# Patient Record
Sex: FEMALE | Race: WHITE | Employment: OTHER | URBAN - METROPOLITAN AREA
[De-identification: names, ages, dates, MRNs, and addresses within clinical notes are randomized per-mention and may not be internally consistent; named-entity substitution may affect disease eponyms.]

---

## 2023-07-30 ENCOUNTER — HOSPITAL ENCOUNTER (EMERGENCY)
Age: 88
Discharge: HOME | End: 2023-07-30
Attending: EMERGENCY MEDICINE
Payer: MEDICARE

## 2023-07-30 VITALS
OXYGEN SATURATION: 96 % | HEIGHT: 65 IN | RESPIRATION RATE: 16 BRPM | DIASTOLIC BLOOD PRESSURE: 72 MMHG | SYSTOLIC BLOOD PRESSURE: 127 MMHG | TEMPERATURE: 98 F | HEART RATE: 59 BPM | BODY MASS INDEX: 22.49 KG/M2 | WEIGHT: 135 LBS

## 2023-07-30 DIAGNOSIS — L50.9 URTICARIA: Primary | ICD-10-CM

## 2023-07-30 PROCEDURE — 99283 EMERGENCY DEPT VISIT LOW MDM: CPT

## 2023-07-30 PROCEDURE — 6370000000 HC RX 637 (ALT 250 FOR IP): Performed by: PHYSICIAN ASSISTANT

## 2023-07-30 RX ORDER — DEXAMETHASONE SODIUM PHOSPHATE 10 MG/ML
8 INJECTION INTRAMUSCULAR; INTRAVENOUS
Status: DISCONTINUED | OUTPATIENT
Start: 2023-07-30 | End: 2023-07-30

## 2023-07-30 RX ORDER — HYDROXYZINE PAMOATE 25 MG/1
25 CAPSULE ORAL 3 TIMES DAILY PRN
Qty: 21 CAPSULE | Refills: 0 | Status: SHIPPED | OUTPATIENT
Start: 2023-07-30 | End: 2023-08-06

## 2023-07-30 RX ORDER — FAMOTIDINE 20 MG/1
20 TABLET, FILM COATED ORAL
Status: COMPLETED | OUTPATIENT
Start: 2023-07-30 | End: 2023-07-30

## 2023-07-30 RX ADMIN — FAMOTIDINE 20 MG: 20 TABLET, FILM COATED ORAL at 11:58

## 2023-07-30 ASSESSMENT — LIFESTYLE VARIABLES
HOW MANY STANDARD DRINKS CONTAINING ALCOHOL DO YOU HAVE ON A TYPICAL DAY: PATIENT DOES NOT DRINK
HOW OFTEN DO YOU HAVE A DRINK CONTAINING ALCOHOL: NEVER

## 2023-07-30 ASSESSMENT — PAIN - FUNCTIONAL ASSESSMENT: PAIN_FUNCTIONAL_ASSESSMENT: NONE - DENIES PAIN

## 2023-07-30 NOTE — ED TRIAGE NOTES
Patient complaining of rash that started on 7/26, seen at [facility] from Urgent care due to low blood pressure and low saturation. Patient also advises that she was diagnosed with hives and prescribed steroids and told to take zyrtec. Patient advises it cleared somewhat the past two days however worse again this morning. Patient with rash areas that are itching and patient had placed calamine lotion to areas. Patient [facility] and unsure if any detergents were changed there but will be starting dye free detergent. Patient denies any shortness of breath or further complaints at this time.

## 2023-07-30 NOTE — ED PROVIDER NOTES
Emergency Department Provider Note       PCP: DEBBI Alegria   Age: 80 y.o. Sex: female     DISPOSITION Decision To Discharge 07/30/2023 12:52:58 PM       ICD-10-CM    1. Urticaria  L50.9           Medical Decision Making     Complexity of Problems Addressed:  Complexity of Problem: 1 acute, uncomplicated illness or injury. Data Reviewed and Analyzed:  Category 1:   I independently ordered and reviewed each unique test.  I reviewed external records: ED visit note from an outside group. The patients assessment required an independent historian: son/daugther. The reason they were needed is important historical information not provided by the patient. Category 3: Discussion of management or test interpretation. 40-year-old female presenting to the emergency department for reevaluation of urticaria. Seen at outside facility a few days ago, was improving but then seemed to become more pruritic today. No swelling/respiratory symptoms. On exam she does have macular though papular rash to her back, posterior aspect of her arms and legs. No signs to suggest secondary infection, there are no fluid-filled lesions. Symptoms are bilateral, I do not suspect shingles. The rash does seem to be urticarial in appearance. Does not look like bedbug/insect bites. Patient is already on prednisone at home though I do not see a prescription for this in her chart, per family she is taking 40 mg daily. She can continue taking this. She can continue the calamine lotion. If she would like to trial Vistaril instead of the Zyrtec we can add this which they are in agreement with. Advised against taking both antihistamines together due to increased risk of anticholinergic effect. Family states that they are going to start washing the patient's clothes and bedding with a hypoallergenic laundry detergent to see if this clears the rash. Follow-up with the PCP provider at the facility this week.   Return for any

## 2023-07-30 NOTE — DISCHARGE INSTRUCTIONS
You can trial the Vistaril as needed for additional itch relief as this can be taken up to 3 times a day if needed however I do not recommend taking with Zyrtec. Continue taking the prednisone that was prescribed to you. I recommend following up with the primary care provider at the facility regarding the rash and seeing if this resolves later this week. Discussed with the facility changes in detergents of any kind and I recommend using a hypoallergenic laundry detergent to see if this helps symptoms as well. Follow-up with your PCP in 1 to 2 days if no improvement. Return to the ER for any new or worsening symptoms.

## 2023-08-09 ENCOUNTER — TELEPHONE (OUTPATIENT)
Dept: PHARMACY | Facility: CLINIC | Age: 88
End: 2023-08-09

## 2023-08-09 NOTE — TELEPHONE ENCOUNTER
[facility][note 3] CLINICAL PHARMACY: ADHERENCE REVIEW  Identified care gap per [employer]: fills at [facility] : ACE/ARB and Statin adherence    ASSESSMENT    ACE/ARB ADHERENCE    Insurance Records claims through 23 (Prior Year [address] = not reported; YTD [address] [place] [address] = 95%; Potential Fail Date: 23): Losartan 25 mg tab last filled on 23 for 30 day supply. Next refill due: 23  Prescriber Joey Clifton Koko    Per [facility] portal today last filled 7/26/23 x 12 days at [facility] pharmacy? Prescribed si tablet/capsule daily    Per Reconcile Dispense History:   LOSARTAN 25MG TABLETS 2023 30 30 each [facility] #. .. LOSARTAN 100MG TABLETS 2023 90 90 each Nga [place], [facility] #. .. LOSARTAN 100MG TABLETS 2023 90 90 each [address] [place] [address] #. .. LOSARTAN 100MG TABLETS 2022 90 90 each Nga [place], [facility] #. .. LOSARTAN 100MG TABLETS 2022 90 90 each Nga [facility] #. .. BP Readings from Last 3 Encounters:   23 127/72     CrCl cannot be calculated (No successful lab value found. ). No results found for: CREATININE  No results found for: [facility] Records claims through 23 (Prior Year [address] = not reported; YTD [address] = FIRST FILL; Potential Fail Date: 23): Atorvastatin 40 mg tab last filled on 23 for 90 day supply. Next refill due: 23  Prescriber LEIGH ANN CARABALLO    Prescribed si tablet/capsule daily    Per Reconcile Dispense History:   ATORVASTATIN 40MG TABLETS 2023 90 90 each Nga [place], [facility] #. .. ATORVASTATIN 40MG TABLETS 2022 90 90 each [address] [place] [address] #. .. ATORVASTATIN 40MG TABLETS 2022 90 90 each [address] [place] [address] #. ..   9RR per hyperlink    PLAN  The following are interventions that have been

## 2024-04-01 ENCOUNTER — APPOINTMENT (OUTPATIENT)
Dept: CT IMAGING | Age: 89
End: 2024-04-01
Payer: MEDICARE

## 2024-04-01 ENCOUNTER — HOSPITAL ENCOUNTER (EMERGENCY)
Age: 89
Discharge: HOME | End: 2024-04-02
Attending: EMERGENCY MEDICINE
Payer: MEDICARE

## 2024-04-01 ENCOUNTER — APPOINTMENT (OUTPATIENT)
Dept: GENERAL RADIOLOGY | Age: 89
End: 2024-04-01
Payer: MEDICARE

## 2024-04-01 DIAGNOSIS — I10 ESSENTIAL HYPERTENSION: ICD-10-CM

## 2024-04-01 DIAGNOSIS — R55 SYNCOPE AND COLLAPSE: Primary | ICD-10-CM

## 2024-04-01 DIAGNOSIS — I71.40 ABDOMINAL AORTIC ANEURYSM (AAA) WITHOUT RUPTURE, UNSPECIFIED PART (HCC): ICD-10-CM

## 2024-04-01 LAB
ALBUMIN SERPL-MCNC: 3.6 G/DL (ref 3.2–4.6)
ALBUMIN/GLOB SERPL: 0.9 (ref 0.4–1.6)
ALP SERPL-CCNC: 104 U/L (ref 50–130)
ALT SERPL-CCNC: 24 U/L (ref 12–65)
ANION GAP SERPL CALC-SCNC: 6 MMOL/L (ref 2–11)
AST SERPL-CCNC: 24 U/L (ref 15–37)
BASOPHILS # BLD: 0 K/UL (ref 0–0.2)
BASOPHILS NFR BLD: 0 % (ref 0–2)
BILIRUB SERPL-MCNC: 0.5 MG/DL (ref 0.2–1.1)
BILIRUB UR QL: NEGATIVE
BUN SERPL-MCNC: 22 MG/DL (ref 8–23)
CALCIUM SERPL-MCNC: 9.8 MG/DL (ref 8.3–10.4)
CHLORIDE SERPL-SCNC: 105 MMOL/L (ref 103–113)
CO2 SERPL-SCNC: 28 MMOL/L (ref 21–32)
CREAT SERPL-MCNC: 1.22 MG/DL (ref 0.6–1)
DIFFERENTIAL METHOD BLD: ABNORMAL
EKG ATRIAL RATE: 65 BPM
EKG DIAGNOSIS: NORMAL
EKG P AXIS: 71 DEGREES
EKG P-R INTERVAL: 150 MS
EKG Q-T INTERVAL: 439 MS
EKG QRS DURATION: 104 MS
EKG QTC CALCULATION (BAZETT): 457 MS
EKG R AXIS: 100 DEGREES
EKG T AXIS: 85 DEGREES
EKG VENTRICULAR RATE: 65 BPM
EOSINOPHIL # BLD: 0.1 K/UL (ref 0–0.8)
EOSINOPHIL NFR BLD: 1 % (ref 0.5–7.8)
ERYTHROCYTE [DISTWIDTH] IN BLOOD BY AUTOMATED COUNT: 15 % (ref 11.9–14.6)
GLOBULIN SER CALC-MCNC: 3.8 G/DL (ref 2.8–4.5)
GLUCOSE SERPL-MCNC: 84 MG/DL (ref 65–100)
GLUCOSE UR QL STRIP.AUTO: NEGATIVE MG/DL
HCT VFR BLD AUTO: 43.3 % (ref 35.8–46.3)
HGB BLD-MCNC: 14.1 G/DL (ref 11.7–15.4)
IMM GRANULOCYTES # BLD AUTO: 0 K/UL (ref 0–0.5)
IMM GRANULOCYTES NFR BLD AUTO: 0 % (ref 0–5)
KETONES UR-MCNC: NEGATIVE MG/DL
LEUKOCYTE ESTERASE UR QL STRIP: ABNORMAL
LYMPHOCYTES # BLD: 0.7 K/UL (ref 0.5–4.6)
LYMPHOCYTES NFR BLD: 7 % (ref 13–44)
MCH RBC QN AUTO: 29.8 PG (ref 26.1–32.9)
MCHC RBC AUTO-ENTMCNC: 32.6 G/DL (ref 31.4–35)
MCV RBC AUTO: 91.5 FL (ref 82–102)
MONOCYTES # BLD: 0.9 K/UL (ref 0.1–1.3)
MONOCYTES NFR BLD: 8 % (ref 4–12)
NEUTS SEG # BLD: 8.8 K/UL (ref 1.7–8.2)
NEUTS SEG NFR BLD: 84 % (ref 43–78)
NITRITE UR QL: NEGATIVE
NRBC # BLD: 0 K/UL (ref 0–0.2)
PH UR: 7.5 (ref 5–9)
PLATELET # BLD AUTO: 165 K/UL (ref 150–450)
PMV BLD AUTO: 9.3 FL (ref 9.4–12.3)
POTASSIUM SERPL-SCNC: 4.2 MMOL/L (ref 3.5–5.1)
PROT SERPL-MCNC: 7.4 G/DL (ref 6.3–8.2)
PROT UR QL: NEGATIVE MG/DL
RBC # BLD AUTO: 4.73 M/UL (ref 4.05–5.2)
RBC # UR STRIP: NEGATIVE
SERVICE CMNT-IMP: ABNORMAL
SODIUM SERPL-SCNC: 139 MMOL/L (ref 136–146)
SP GR UR: 1.02 (ref 1–1.02)
TROPONIN I SERPL HS-MCNC: 8.6 PG/ML (ref 0–14)
UROBILINOGEN UR QL: 0.2 EU/DL (ref 0.2–1)
WBC # BLD AUTO: 10.5 K/UL (ref 4.3–11.1)

## 2024-04-01 PROCEDURE — 93010 ELECTROCARDIOGRAM REPORT: CPT | Performed by: INTERNAL MEDICINE

## 2024-04-01 PROCEDURE — 85025 COMPLETE CBC W/AUTO DIFF WBC: CPT

## 2024-04-01 PROCEDURE — 71045 X-RAY EXAM CHEST 1 VIEW: CPT

## 2024-04-01 PROCEDURE — 72125 CT NECK SPINE W/O DYE: CPT

## 2024-04-01 PROCEDURE — 81003 URINALYSIS AUTO W/O SCOPE: CPT

## 2024-04-01 PROCEDURE — 74177 CT ABD & PELVIS W/CONTRAST: CPT

## 2024-04-01 PROCEDURE — 81015 MICROSCOPIC EXAM OF URINE: CPT

## 2024-04-01 PROCEDURE — 80053 COMPREHEN METABOLIC PANEL: CPT

## 2024-04-01 PROCEDURE — 70450 CT HEAD/BRAIN W/O DYE: CPT

## 2024-04-01 PROCEDURE — 84484 ASSAY OF TROPONIN QUANT: CPT

## 2024-04-01 PROCEDURE — 99285 EMERGENCY DEPT VISIT HI MDM: CPT

## 2024-04-01 PROCEDURE — 87086 URINE CULTURE/COLONY COUNT: CPT

## 2024-04-01 PROCEDURE — 93005 ELECTROCARDIOGRAM TRACING: CPT | Performed by: EMERGENCY MEDICINE

## 2024-04-01 PROCEDURE — 2580000003 HC RX 258: Performed by: EMERGENCY MEDICINE

## 2024-04-01 PROCEDURE — 6360000004 HC RX CONTRAST MEDICATION: Performed by: EMERGENCY MEDICINE

## 2024-04-01 RX ORDER — LOSARTAN POTASSIUM 100 MG/1
TABLET ORAL
COMMUNITY

## 2024-04-01 RX ORDER — ACETAMINOPHEN 325 MG/1
TABLET ORAL
COMMUNITY

## 2024-04-01 RX ORDER — SODIUM CHLORIDE, SODIUM LACTATE, POTASSIUM CHLORIDE, AND CALCIUM CHLORIDE .6; .31; .03; .02 G/100ML; G/100ML; G/100ML; G/100ML
500 INJECTION, SOLUTION INTRAVENOUS
Status: COMPLETED | OUTPATIENT
Start: 2024-04-01 | End: 2024-04-01

## 2024-04-01 RX ORDER — CETIRIZINE HYDROCHLORIDE 10 MG/1
10 TABLET ORAL
COMMUNITY
Start: 2023-07-27

## 2024-04-01 RX ORDER — NEOMYCIN SULFATE, POLYMYXIN B SULFATE AND HYDROCORTISONE 10; 3.5; 1 MG/ML; MG/ML; [USP'U]/ML
SUSPENSION/ DROPS AURICULAR (OTIC)
COMMUNITY

## 2024-04-01 RX ORDER — LEVOTHYROXINE SODIUM 175 UG/1
TABLET ORAL
COMMUNITY

## 2024-04-01 RX ORDER — MELOXICAM 7.5 MG/1
TABLET ORAL
COMMUNITY

## 2024-04-01 RX ORDER — ATORVASTATIN CALCIUM 40 MG/1
TABLET, FILM COATED ORAL
COMMUNITY
Start: 2023-04-06

## 2024-04-01 RX ADMIN — SODIUM CHLORIDE, POTASSIUM CHLORIDE, SODIUM LACTATE AND CALCIUM CHLORIDE 500 ML: 600; 310; 30; 20 INJECTION, SOLUTION INTRAVENOUS at 21:43

## 2024-04-01 RX ADMIN — IOPAMIDOL 100 ML: 755 INJECTION, SOLUTION INTRAVENOUS at 21:49

## 2024-04-01 ASSESSMENT — ENCOUNTER SYMPTOMS
NAUSEA: 0
COLOR CHANGE: 0
RHINORRHEA: 0
ABDOMINAL PAIN: 1
BACK PAIN: 0
VOMITING: 0
COUGH: 0
SHORTNESS OF BREATH: 0

## 2024-04-01 ASSESSMENT — PAIN - FUNCTIONAL ASSESSMENT: PAIN_FUNCTIONAL_ASSESSMENT: 0-10

## 2024-04-01 ASSESSMENT — PAIN SCALES - GENERAL: PAINLEVEL_OUTOF10: 0

## 2024-04-02 VITALS
TEMPERATURE: 97.7 F | HEIGHT: 65 IN | SYSTOLIC BLOOD PRESSURE: 148 MMHG | BODY MASS INDEX: 23.32 KG/M2 | RESPIRATION RATE: 17 BRPM | OXYGEN SATURATION: 95 % | WEIGHT: 140 LBS | DIASTOLIC BLOOD PRESSURE: 76 MMHG | HEART RATE: 65 BPM

## 2024-04-02 LAB
BACTERIA URNS QL MICRO: 0 /HPF
CASTS URNS QL MICRO: 0 /LPF
CRYSTALS URNS QL MICRO: 0 /LPF
EPI CELLS #/AREA URNS HPF: NORMAL /HPF
MUCOUS THREADS URNS QL MICRO: 0 /LPF
OTHER OBSERVATIONS: NORMAL
RBC #/AREA URNS HPF: NORMAL /HPF
WBC URNS QL MICRO: NORMAL /HPF

## 2024-04-02 NOTE — ED NOTES
Patient mobility status  with no difficulty. Provider aware     I have reviewed discharge instructions with the patient and caregiver.  The patient and caregiver verbalized understanding.    Patient left ED via Discharge Method: ambulatory to Home with family.    Opportunity for questions and clarification provided.     Patient given 0 scripts.

## 2024-04-02 NOTE — ED TRIAGE NOTES
Pt had episode of syncope today at  [facility] tonight. Pt reports loc and fell to floor. Only complaint patient had was some stomach pain earlier. Pt reporting pain in back of head and both sides of neck. Denies n/v, dizziness, blurry vision, double vision.

## 2024-04-02 NOTE — ED PROVIDER NOTES
Emergency Department Provider Note       PCP: Marissa Ramirez PA   Age: 88 y.o.   Sex: female     DISPOSITION Decision To Discharge 04/02/2024 12:10:41 AM       ICD-10-CM    1. Syncope and collapse  R55 [facility] [place]      2. Abdominal aortic aneurysm (AAA) without rupture, unspecified part (HCC)  I71.40 [facility] - Francisco Dudley MD, Vascular Surgery, [place]      3. Essential hypertension  I10           Medical Decision Making     Patient had a syncopal event earlier today and had some abdominal discomfort yesterday and later in the afternoon after her syncopal event.  There is no complaint of abdominal pain upon arrival but some mild left lower quadrant tenderness was discovered.  Labs were unremarkable.  Urine was negative for infection but cultured.  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  These were both previously present on a scan in 2021 that I found it [place].  The aortic aneurysm was slightly larger.  No leak and no inflammation.  Patient and family seemed unaware of previous findings.  Referral placed to vascular surgery and discussion with vascular surgery occurred but they did not see the patient and were not asked to see the patient.  We offered admission for syncope but the patient and family prefer to have her discharged home and follow-up once they learn cardiology follow-up could be arranged.  Their previous efforts at cardiology referral and placement had been unsuccessful and were many many months out.  ED Course as of 04/02/24 0019   Mon Apr 01, 2024   2305 Family and patient seem unaware of aortic aneurysm diagnosis.  I see it on a CT scan from 2021 at [facility].  Referral will be placed to vascular surgery for close follow-up.  Urine dip pending. [KM]   e Apr 02, 2024   0009 Reviewed CT results with vascular surgery and they will see in follow-up.  There is no inflammation on the CT around the aortic aneurysm and no

## 2024-04-02 NOTE — DISCHARGE INSTRUCTIONS
Will continue current medications.  Return for worsening symptoms.  Follow-up with your primary care doctor within a week for follow-up and recheck.  Follow-up with [facility] cardiology and vascular surgery when called with appointment time.  Call them both in 2 to 3 days if you have not heard from them.   Negative

## 2024-04-04 LAB
BACTERIA SPEC CULT: NORMAL
BACTERIA SPEC CULT: NORMAL
SERVICE CMNT-IMP: NORMAL

## 2024-04-08 ENCOUNTER — OFFICE VISIT (OUTPATIENT)
Dept: VASCULAR SURGERY | Age: 89
End: 2024-04-08
Payer: MEDICARE

## 2024-04-08 VITALS
DIASTOLIC BLOOD PRESSURE: 93 MMHG | HEIGHT: 65 IN | HEART RATE: 59 BPM | SYSTOLIC BLOOD PRESSURE: 166 MMHG | OXYGEN SATURATION: 93 % | BODY MASS INDEX: 25.33 KG/M2 | WEIGHT: 152 LBS

## 2024-04-08 DIAGNOSIS — I71.43 INFRARENAL ABDOMINAL AORTIC ANEURYSM (AAA) WITHOUT RUPTURE (HCC): Primary | ICD-10-CM

## 2024-04-08 PROCEDURE — 99203 OFFICE O/P NEW LOW 30 MIN: CPT | Performed by: STUDENT IN AN ORGANIZED HEALTH CARE EDUCATION/TRAINING PROGRAM

## 2024-04-08 PROCEDURE — 1123F ACP DISCUSS/DSCN MKR DOCD: CPT | Performed by: STUDENT IN AN ORGANIZED HEALTH CARE EDUCATION/TRAINING PROGRAM

## 2024-04-08 RX ORDER — SERTRALINE HYDROCHLORIDE 25 MG/1
25 TABLET, FILM COATED ORAL DAILY
COMMUNITY

## 2024-04-08 RX ORDER — SODIUM BICARBONATE 650 MG/1
650 TABLET ORAL 4 TIMES DAILY
COMMUNITY

## 2024-04-08 RX ORDER — LORATADINE 10 MG/1
10 TABLET ORAL DAILY
COMMUNITY

## 2024-04-08 RX ORDER — HYDROXYZINE HYDROCHLORIDE 25 MG/1
25 TABLET, FILM COATED ORAL 3 TIMES DAILY PRN
COMMUNITY

## 2024-04-08 RX ORDER — CALCIUM CARBONATE 300MG(750)
TABLET,CHEWABLE ORAL
COMMUNITY

## 2024-04-11 NOTE — PROGRESS NOTES
VASCULAR SURGERY   [address] [facility] [address] [address]  [phone] FAX: [phone]        Shannan Damon  : 1935    Reason for visit: 5.2cm    Chief Complaint: 88 y.o. female presents with 5.2cm AAA. She denies abdominal pain. CTA review with calcified iliac arteries and severe angulated infrarenal neck. Patient with history of colectomy, ostomy and extensive abdominal wall debridements. Long discussion with patient and daughter regarding her not being a surgical candidate. Patient understands implications and agrees with treatment plan.       Plan:   AAA: She is not a surgical candidate as the risks for open or endovascular repair greatly outweigh the risk for rupture for her 5.2cm AAA. No further imaging is required.     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  Recommend vascular surgery and/or interventional radiology consultation.     Findings suggestive of proctocolitis.     Indeterminant 4.3 cm right renal lesion. Recommend further evaluation with  nonemergent dedicated renal protocol CT or MR.     Additional chronic findings as above.    Constitutional:   Negative for fevers and unexplained weight loss.  Eyes:   Negative for visual disturbance.  ENT:   Negative for significant hearing loss and tinnitus.  Respiratory:   Negative for hemoptysis.  Cardiovascular:   Negative except as noted in HPI.  Gastrointestinal:   Negative for melena and abdominal pain.  Genitourinary:   Negative for hematuria, renal stones.  Integumentary:   Negative for rash or non-healing wounds  Hematologic/Lymphatic:   Negative for excessive bleeding hx or clotting disorder.  Musculoskeletal:  Negative for active, unexplained/severe joint pain.  Neurological:   Negative for stroke.    Behavioral/Psych:   Negative for suicidal

## 2024-04-12 ENCOUNTER — INITIAL CONSULT (OUTPATIENT)
Age: 89
End: 2024-04-12
Payer: MEDICARE

## 2024-04-12 VITALS
HEIGHT: 65 IN | HEART RATE: 58 BPM | BODY MASS INDEX: 25.33 KG/M2 | DIASTOLIC BLOOD PRESSURE: 90 MMHG | SYSTOLIC BLOOD PRESSURE: 148 MMHG | WEIGHT: 152 LBS

## 2024-04-12 DIAGNOSIS — I71.40 ABDOMINAL AORTIC ANEURYSM (AAA) WITHOUT RUPTURE, UNSPECIFIED PART (HCC): ICD-10-CM

## 2024-04-12 DIAGNOSIS — R55 SYNCOPE, UNSPECIFIED SYNCOPE TYPE: Primary | ICD-10-CM

## 2024-04-12 DIAGNOSIS — E78.2 MIXED HYPERLIPIDEMIA: ICD-10-CM

## 2024-04-12 DIAGNOSIS — I10 ESSENTIAL HYPERTENSION: ICD-10-CM

## 2024-04-12 PROCEDURE — 1123F ACP DISCUSS/DSCN MKR DOCD: CPT | Performed by: INTERNAL MEDICINE

## 2024-04-12 PROCEDURE — 93000 ELECTROCARDIOGRAM COMPLETE: CPT | Performed by: INTERNAL MEDICINE

## 2024-04-12 PROCEDURE — 99204 OFFICE O/P NEW MOD 45 MIN: CPT | Performed by: INTERNAL MEDICINE

## 2024-04-12 RX ORDER — LEVOTHYROXINE SODIUM 0.1 MG/1
100 TABLET ORAL EVERY MORNING
COMMUNITY
Start: 2023-06-27

## 2024-04-12 ASSESSMENT — ENCOUNTER SYMPTOMS
COUGH: 0
SHORTNESS OF BREATH: 0
ABDOMINAL PAIN: 0
VOMITING: 0
NAUSEA: 0

## 2024-04-12 NOTE — PROGRESS NOTES
[facility], [address], [address]     [place] [address]    Patient:  Shannan Damon  1935       SUBJECTIVE:  Shannan Damon is a  88 y.o. female seen for a visit regarding the following:     Chief Complaint   Patient presents with    Consultation     CC: Syncope    HPI:   88 y.o. female  with a history of moderate aortic stenosis, mild AI, HTN, HLD, 52 mm AAA who is here for consult for syncope.    Patient was evaluated in the emergency room on 4/1/2024, at that time CT imaging of the abdomen demonstrated 52 mm AAA with pseudoaneurysm of the right common iliac artery.  EKG at that time demonstrated sinus rhythm, nonspecific ST abnormalities, no acute ST segment changes concerning for ischemia injury or infarct.  She was monitored in the emergency room and discharged with follow-up.    Patient is here with her her daughter in law who helps with subjective history as patient with memory problems. Patient lives at assisted living, reportedly Day before some unsteadiness. No prior events. No prodrome. Since then patient reports that she has not had any further events. No difficulty moving arms or legs above baseline, continues to use cane. No recent falls prior to this event. Taking medications as directed without missing doses. Drinks about 4 cups coffee per day. No chest pain, dyspnea at rest. Some dyspnea with walking. No leg swelling.     Cardiovascular Testing:  - [facility] 6/24/2023: LVEF 55-59% mildly increased wall thickness, abnormal diastolic function, RV normal, Valves: Moderate aortic stenosis mean gradient 20 mmHg, mild AI, trace MR, mild TR, RVSP 32 mmHg.    Past medical history, past surgical history, family history, social history, and medications were all reviewed with the patient today and updated as necessary.         There are no problems to display for this patient.      No family history on file.    Social History     Tobacco Use    Smoking status:

## 2024-05-13 ENCOUNTER — TELEPHONE (OUTPATIENT)
Age: 89
End: 2024-05-13

## 2024-05-13 NOTE — TELEPHONE ENCOUNTER
Spoke to pts son made him aware Please call the patient regarding their echocardiogram result.     Echocardiogram demonstrates normal heart squeezing function.  There is moderate narrowing of the aortic valve which will need to be closely monitored.  Mild to moderate regurgitation of the aortic and tricuspid valves.     Continue current medicines at this time.  If she develops chest pain, shortness of breath, dizziness or lightheadedness she should call 911.       Pts son verbalized understanding and stated they will keep upcoming scheduled appt in June and that they mailed back the monitor the pt was wearing.

## 2024-05-13 NOTE — TELEPHONE ENCOUNTER
----- Message from Oscar Calloway DO sent at 5/13/2024  3:36 PM EDT -----  Please call the patient regarding their echocardiogram result.    Echocardiogram demonstrates normal heart squeezing function.  There is moderate narrowing of the aortic valve which will need to be closely monitored.  Mild to moderate regurgitation of the aortic and tricuspid valves.    Continue current medicines at this time.  If she develops chest pain, shortness of breath, dizziness or lightheadedness she should call 911.

## 2024-05-22 ENCOUNTER — TELEPHONE (OUTPATIENT)
Age: 89
End: 2024-05-22

## 2024-05-22 NOTE — TELEPHONE ENCOUNTER
Called patients emergency contact, Jose Juan. Left him a message to return my call regarding patients monitor results

## 2024-05-22 NOTE — TELEPHONE ENCOUNTER
----- Message from Oscar Calloway DO sent at 5/21/2024  4:35 PM EDT -----  Please call the patient to let them know their results.     No sustained arrhythmias (or abnormal heart rhythms) were seen on the monitor you wore at home.